# Patient Record
Sex: MALE | Race: WHITE | Employment: OTHER | URBAN - METROPOLITAN AREA
[De-identification: names, ages, dates, MRNs, and addresses within clinical notes are randomized per-mention and may not be internally consistent; named-entity substitution may affect disease eponyms.]

---

## 2020-01-18 ENCOUNTER — OFFICE VISIT (OUTPATIENT)
Dept: OPHTHALMOLOGY | Facility: CLINIC | Age: 50
End: 2020-01-18
Payer: COMMERCIAL

## 2020-01-18 DIAGNOSIS — H11.152 PINGUECULA, LEFT EYE: ICD-10-CM

## 2020-01-18 DIAGNOSIS — H52.4 PRESBYOPIA OF BOTH EYES: Primary | ICD-10-CM

## 2020-01-18 PROBLEM — K57.92 DIVERTICULITIS: Status: ACTIVE | Noted: 2019-05-16

## 2020-01-18 RX ORDER — EZETIMIBE AND SIMVASTATIN 10; 10 MG/1; MG/1
10 TABLET ORAL DAILY
COMMUNITY
Start: 2020-01-04

## 2020-01-18 ASSESSMENT — CUP TO DISC RATIO
OD_RATIO: 0.2
OS_RATIO: 0.2

## 2020-01-18 ASSESSMENT — VISUAL ACUITY
OD_SC: 20/20
OS_CC: J1+
OS_SC+: -
OS_SC: 20/20
OD_CC: J1+
OD_SC+: -
METHOD: SNELLEN - LINEAR

## 2020-01-18 ASSESSMENT — EXTERNAL EXAM - RIGHT EYE: OD_EXAM: NORMAL

## 2020-01-18 ASSESSMENT — EXTERNAL EXAM - LEFT EYE: OS_EXAM: NORMAL

## 2020-01-18 ASSESSMENT — REFRACTION_MANIFEST
OS_SPHERE: +1.75
OS_AXIS: 050
OS_ADD: +1.25
OD_SPHERE: +1.75
OD_AXIS: 112
OD_CYLINDER: +0.25
OD_ADD: +1.25
OS_CYLINDER: +0.25

## 2020-01-18 ASSESSMENT — REFRACTION_WEARINGRX
OS_CYLINDER: SPHERE
OD_SPHERE: +1.25
OD_CYLINDER: SPHERE
OS_SPHERE: +1.25
SPECS_TYPE: OTC READERS

## 2020-01-18 ASSESSMENT — REFRACTION
OS_AXIS: 050
OS_ADD: +1.25
OS_SPHERE: +0.50
OD_CYLINDER: +0.25
OD_AXIS: 112
OD_SPHERE: +0.50
OD_ADD: +1.25
OS_CYLINDER: +0.25

## 2020-01-18 ASSESSMENT — SLIT LAMP EXAM - LIDS
COMMENTS: NORMAL
COMMENTS: NORMAL

## 2020-01-18 ASSESSMENT — CONF VISUAL FIELD
METHOD: COUNTING FINGERS
OD_NORMAL: 1
OS_NORMAL: 1

## 2020-01-18 ASSESSMENT — TONOMETRY
OS_IOP_MMHG: 14
IOP_METHOD: ICARE
OD_IOP_MMHG: 15

## 2020-01-18 NOTE — NURSING NOTE
Chief Complaints and History of Present Illnesses   Patient presents with     Annual Eye Exam     Chief Complaint(s) and History of Present Illness(es)     Annual Eye Exam     Laterality: both eyes    Quality: blurred vision    Context: near vision    Course: gradually worsening    Associated symptoms: Negative for eye pain, floaters and flashes    Treatments tried: no treatments    Pain scale: 0/10              Comments     Comprehensive Eye Exam    Vision has gotten blurrier since [facility] ~10yrs ago, especially at near.     PoH: Presbyopia, Pterygium(?) LE [longstanding and unchanging]    Beronica Kerr COT 7:56 AM January 18, 2020

## 2020-01-18 NOTE — PROGRESS NOTES
History  HPI     Annual Eye Exam     In both eyes.  Charactertized as  blurred vision.  Context:  near vision.  Since onset it is gradually worsening.  Associated symptoms include Negative for eye pain, floaters and flashes.  Treatments tried include no treatments.  Pain was noted as 0/10.              Comments     Comprehensive Eye Exam    Vision has gotten blurrier since [facility] ~10yrs ago, especially at near.     PoH: Presbyopia, Pterygium(?) LE [longstanding and unchanging]    Beronica Kerr COT 7:56 AM January 18, 2020              Last edited by Beronica Kerr on 1/18/2020  7:56 AM. (History)          Assessment/Plan  (H52.4) Presbyopia of both eyes  (primary encounter diagnosis)  Comment: Hyperopia with presbyopia both eyes, happy with reading glasses  Plan: REFRACTION         Educated patient on condition and clinical findings. Dispensed spectacle prescription for as-needed wear. Monitor annually.    (H11.152) Pinguecula, left eye  Comment: Asymptomatic  Plan:  Recommended sunglasses and artificial tears to delay progression. Monitor annually.    Return to clinic in 1 year for comprehensive eye exam.    Complete documentation of historical and exam elements from today's encounter can  be found in the full encounter summary report (not reduplicated in this progress  note). I personally obtained the chief complaint(s) and history of present illness. I  confirmed and edited as necessary the review of systems, past medical/surgical  history, family history, social history, and examination findings as documented by  others; and I examined the patient myself. I personally reviewed the relevant tests,  images, and reports as documented above. I formulated and edited as necessary the  assessment and plan and discussed the findings and management plan with the  patient and family.    Donavan Chatterjee, OD, FAAO